# Patient Record
Sex: FEMALE | Race: WHITE | NOT HISPANIC OR LATINO | URBAN - METROPOLITAN AREA
[De-identification: names, ages, dates, MRNs, and addresses within clinical notes are randomized per-mention and may not be internally consistent; named-entity substitution may affect disease eponyms.]

---

## 2022-11-07 ENCOUNTER — EMERGENCY (EMERGENCY)
Facility: HOSPITAL | Age: 42
LOS: 1 days | Discharge: HOME | End: 2022-11-07
Attending: EMERGENCY MEDICINE
Payer: MEDICAID

## 2022-11-07 VITALS
TEMPERATURE: 98 F | OXYGEN SATURATION: 98 % | WEIGHT: 143.3 LBS | RESPIRATION RATE: 18 BRPM | DIASTOLIC BLOOD PRESSURE: 90 MMHG | HEART RATE: 99 BPM | SYSTOLIC BLOOD PRESSURE: 138 MMHG

## 2022-11-07 VITALS
RESPIRATION RATE: 20 BRPM | OXYGEN SATURATION: 99 % | SYSTOLIC BLOOD PRESSURE: 140 MMHG | DIASTOLIC BLOOD PRESSURE: 92 MMHG | HEART RATE: 96 BPM | TEMPERATURE: 98 F

## 2022-11-07 PROCEDURE — 99283 EMERGENCY DEPT VISIT LOW MDM: CPT

## 2022-11-07 PROCEDURE — 99284 EMERGENCY DEPT VISIT MOD MDM: CPT

## 2022-11-07 RX ORDER — FLUCONAZOLE 150 MG/1
1 TABLET ORAL
Qty: 1 | Refills: 0
Start: 2022-11-07

## 2022-11-07 RX ORDER — METRONIDAZOLE 500 MG
1 TABLET ORAL
Qty: 14 | Refills: 0
Start: 2022-11-07 | End: 2022-11-13

## 2022-11-07 NOTE — ED ADULT NURSE NOTE - OBJECTIVE STATEMENT
Patient c/o vaginal itching off and on x1 year with symptoms worsening over last few days. Patient reports odorless vaginal discharge.

## 2022-11-07 NOTE — ED PROVIDER NOTE - CLINICAL SUMMARY MEDICAL DECISION MAKING FREE TEXT BOX
42-year-old woman, history of anxiety and depression, complains of about 1 year of intermittent grayish vaginal discharge symptoms worsened in the past few days--external pelvic exam c/w likely bacterial vaginosis though cannot r/o candidiasis, so will prescribe both metronidazole and Diflucan.  Advised strict return precautions and gyn f/u.

## 2022-11-07 NOTE — ED PROVIDER NOTE - NSFOLLOWUPINSTRUCTIONS_ED_ALL_ED_FT
Bacterial Vaginosis       Bacterial vaginosis is an infection that occurs when the normal balance of bacteria in the vagina changes. This change is caused by an overgrowth of certain bacteria in the vagina. Bacterial vaginosis is the most common vaginal infection among females aged 15 to 44 years.    This condition increases the risk of sexually transmitted infections (STIs). Treatment can help reduce this risk. Treatment is very important for pregnant women because this condition can cause babies to be born early (prematurely) or at a low birth weight.      What are the causes?    This condition is caused by an increase in harmful bacteria that are normally present in small amounts in the vagina. However, the exact reason this condition develops is not known.    You cannot get bacterial vaginosis from toilet seats, bedding, swimming pools, or contact with objects around you.      What increases the risk?    The following factors may make you more likely to develop this condition:  •Having a new sexual partner or multiple sexual partners, or having unprotected sex.      •Douching.      •Having an intrauterine device (IUD).      •Smoking.      •Abusing drugs and alcohol. This may lead to riskier sexual behavior.      •Taking certain antibiotic medicines.      •Being pregnant.        What are the signs or symptoms?    Some women with this condition have no symptoms. Symptoms may include:  •Gray or white vaginal discharge. The discharge can be watery or foamy.      •A fish-like odor with discharge, especially after sex or during menstruation.      •Itching in and around the vagina.      •Burning or pain with urination.        How is this diagnosed?    This condition is diagnosed based on:  •Your medical history.      •A physical exam of the vagina.      •Checking a sample of vaginal fluid for harmful bacteria or abnormal cells.        How is this treated?    This condition is treated with antibiotic medicines. These may be given as a pill, a vaginal cream, or a medicine that is put into the vagina (suppository). If the condition comes back after treatment, a second round of antibiotics may be needed.      Follow these instructions at home:    Medicines     •Take or apply over-the-counter and prescription medicines only as told by your health care provider.      •Take or apply your antibiotic medicine as told by your health care provider. Do not stop using the antibiotic even if you start to feel better.      General instructions     •If you have a female sexual partner, tell her that you have a vaginal infection. She should follow up with her health care provider. If you have a male sexual partner, he does not need treatment.      •Avoid sexual activity until you finish treatment.      •Drink enough fluid to keep your urine pale yellow.    •Keep the area around your vagina and rectum clean.  •Wash the area daily with warm water.      •Wipe yourself from front to back after using the toilet.        •If you are breastfeeding, talk to your health care provider about continuing breastfeeding during treatment.      •Keep all follow-up visits. This is important.        How is this prevented?    Self-care     • Do not douche.      •Wash the outside of your vagina with warm water only.      •Wear cotton or cotton-lined underwear.      •Avoid wearing tight pants and pantyhose, especially during the summer.      Safe sex   •Use protection when having sex. This includes:  •Using condoms.      •Using dental dams. This is a thin layer of a material made of latex or polyurethane that protects the mouth during oral sex.        •Limit the number of sexual partners. To help prevent bacterial vaginosis, it is best to have sex with just one partner (monogamous relationship).      •Make sure you and your sexual partner are tested for STIs.      Drugs and alcohol     • Do not use any products that contain nicotine or tobacco. These products include cigarettes, chewing tobacco, and vaping devices, such as e-cigarettes. If you need help quitting, ask your health care provider.      • Do not use drugs.    • Do not drink alcohol if:  •Your health care provider tells you not to do this.      •You are pregnant, may be pregnant, or are planning to become pregnant.      •If you drink alcohol:  • Limit how much you have to 0–1 drink a day.       •Be aware of how much alcohol is in your drink. In the [place] one drink equals one 12 oz bottle of beer (355 mL), one 5 oz glass of wine (148 mL), or one 1½ oz glass of hard liquor (44 mL).          Where to find more information    •Centers for Disease Control and Prevention: www.cdc.gov      •American Sexual Health Association (DAVIAN): www.ashastd.org      •[place] Department of Health and Human Services, Office on Women's Health: www.womenshealth.gov        Contact a health care provider if:    •Your symptoms do not improve, even after treatment.      •You have more discharge or pain when urinating.      •You have a fever or chills.      •You have pain in your abdomen or pelvis.      •You have pain during sex.      •You have vaginal bleeding between menstrual periods.        Summary    •Bacterial vaginosis is a vaginal infection that occurs when the normal balance of bacteria in the vagina changes. It results from an overgrowth of certain bacteria.      •This condition increases the risk of sexually transmitted infections (STIs). Getting treated can help reduce this risk.      •Treatment is very important for pregnant women because this condition can cause babies to be born early (prematurely) or at low birth weight.      •This condition is treated with antibiotic medicines. These may be given as a pill, a vaginal cream, or a medicine that is put into the vagina (suppository).      This information is not intended to replace advice given to you by your health care provider. Make sure you discuss any questions you have with your health care provider.      Document Revised: 06/17/2021 Document Reviewed: 06/17/2021    [employer] Patient Education © 2022 [employer]

## 2022-11-07 NOTE — ED PROVIDER NOTE - OBJECTIVE STATEMENT
42-year-old woman, history of anxiety and depression, complains of about 1 year of intermittent grayish vaginal discharge symptoms worsened in the past few days she denies any abdominal pain/fever/dysuria/vaginal bleeding.  Menstrual period has been normal.  She denies any recent sexual activity.

## 2022-11-07 NOTE — ED PROVIDER NOTE - PATIENT PORTAL LINK FT
You can access the FollowMyHealth Patient Portal offered by [facility] by registering at the following website: [email or web address]. By joining [employer]’s FollowMyHealth portal, you will also be able to view your health information using other applications (apps) compatible with our system.

## 2022-11-07 NOTE — ED ADULT NURSE NOTE - ED STAT RN HANDOFF DETAILS 2
Patient discharged home as per MD order. All discharge instructions and F/U visits provided , prescription sent to pharmacy. Patient verbalizes understanding leaving ambulatory in no acute distress.

## 2022-11-07 NOTE — ED PROVIDER NOTE - GENITOURINARY, MLM
greyish vaginal discharge visible externally; Pt did not tolerate speculum exam and therefore bimanual exam was deferred;  Chaperone by PCA Carol

## 2023-03-01 NOTE — ED ADULT TRIAGE NOTE - CCCP TRG CHIEF CMPLNT
POC FAST US    Date/Time: 2/28/2023 11:56 PM  Performed by: Irma Pan DO  Authorized by: Irma Pan DO     Patient location:  ED  Procedure details:     Exam Type:  Diagnostic    Indications: blunt abdominal trauma and blunt chest trauma      Assess for:  Hemothorax, pericardial effusion and intra-abdominal fluid    Technique: FAST      Views obtained:  Heart - Pericardial sac, Suprapubic - Pouch of Hank, RUQ - Levy's Pouch and LUQ - Splenorenal space    Image quality: diagnostic      Image availability:  Images available in PACS  FAST Findings:     RUQ (Hepatorenal) free fluid: absent      LUQ (Splenorenal) free fluid: absent      Suprapubic free fluid: absent      Cardiac wall motion: identified      Pericardial effusion: absent    Interpretation:     Impressions: negative vaginal itching

## 2024-11-22 ENCOUNTER — EMERGENCY (EMERGENCY)
Facility: HOSPITAL | Age: 44
LOS: 1 days | Discharge: HOME | End: 2024-11-22
Attending: STUDENT IN AN ORGANIZED HEALTH CARE EDUCATION/TRAINING PROGRAM
Payer: MEDICAID

## 2024-11-22 VITALS
SYSTOLIC BLOOD PRESSURE: 123 MMHG | OXYGEN SATURATION: 97 % | HEART RATE: 98 BPM | RESPIRATION RATE: 17 BRPM | TEMPERATURE: 99 F | DIASTOLIC BLOOD PRESSURE: 77 MMHG

## 2024-11-22 VITALS
HEART RATE: 107 BPM | HEIGHT: 64 IN | RESPIRATION RATE: 18 BRPM | OXYGEN SATURATION: 97 % | TEMPERATURE: 99 F | WEIGHT: 134.92 LBS | DIASTOLIC BLOOD PRESSURE: 87 MMHG | SYSTOLIC BLOOD PRESSURE: 135 MMHG

## 2024-11-22 LAB — HCG UR QL: NEGATIVE — SIGNIFICANT CHANGE UP

## 2024-11-22 PROCEDURE — 99284 EMERGENCY DEPT VISIT MOD MDM: CPT

## 2024-11-22 PROCEDURE — 72110 X-RAY EXAM L-2 SPINE 4/>VWS: CPT

## 2024-11-22 PROCEDURE — 72110 X-RAY EXAM L-2 SPINE 4/>VWS: CPT | Mod: 26

## 2024-11-22 PROCEDURE — 96372 THER/PROPH/DIAG INJ SC/IM: CPT

## 2024-11-22 PROCEDURE — 99283 EMERGENCY DEPT VISIT LOW MDM: CPT | Mod: 25

## 2024-11-22 PROCEDURE — 81025 URINE PREGNANCY TEST: CPT

## 2024-11-22 RX ORDER — KETOROLAC TROMETHAMINE 30 MG/ML
30 INJECTION INTRAMUSCULAR; INTRAVENOUS ONCE
Refills: 0 | Status: DISCONTINUED | OUTPATIENT
Start: 2024-11-22 | End: 2024-11-22

## 2024-11-22 RX ORDER — CYCLOBENZAPRINE HYDROCHLORIDE 30 MG/1
5 CAPSULE, EXTENDED RELEASE ORAL ONCE
Refills: 0 | Status: COMPLETED | OUTPATIENT
Start: 2024-11-22 | End: 2024-11-22

## 2024-11-22 RX ORDER — IBUPROFEN 200 MG
1 TABLET ORAL
Qty: 20 | Refills: 0
Start: 2024-11-22

## 2024-11-22 RX ADMIN — CYCLOBENZAPRINE HYDROCHLORIDE 5 MILLIGRAM(S): 30 CAPSULE, EXTENDED RELEASE ORAL at 05:52

## 2024-11-22 RX ADMIN — KETOROLAC TROMETHAMINE 30 MILLIGRAM(S): 30 INJECTION INTRAMUSCULAR; INTRAVENOUS at 05:52

## 2024-11-22 NOTE — ED PROVIDER NOTE - OBJECTIVE STATEMENT
44-year-old female hx of PUD, anxiety/depression, presenting with lower back pain after a fall earlier today. She was getting off the bus when she fell down onto her buttocks and back. Denies LE numbness, tingling, weakness, saddle anesthesia, fever, chills, IVDU, hx of cancer, or any other concerning features. No other symptoms. Took Advil earlier, then took Tylenol (last dose at 2am).

## 2024-11-22 NOTE — ED ADULT NURSE NOTE - NSFALLRISKINTERV_ED_ALL_ED

## 2024-11-22 NOTE — ED PROVIDER NOTE - CLINICAL SUMMARY MEDICAL DECISION MAKING FREE TEXT BOX
44-year-old female hx of PUD, anxiety/depression, presenting with lower back pain after a fall earlier today with spinal TTP. XR LS spine ordered, urine pregnancy pending. Pain meds provided.

## 2024-11-22 NOTE — ED ADULT TRIAGE NOTE - CHIEF COMPLAINT QUOTE
Pt reports that  she has LOWER BACK PAIN  and BUTTOCKS pain  s/p fall @ 17.30 pm yesterday . Pt  denies  Head strike ,LOC No blood thinner use . Last TYLENOL intake  1000 mg@  2 am today

## 2024-11-22 NOTE — ED PROVIDER NOTE - NSFOLLOWUPINSTRUCTIONS_ED_ALL_ED_FT
n/a Muscle and Joint Pain: What It Means  Muscle and joint pain is aches and pains in your bones, joints, muscles, and tissues that surround them. This pain can happen in any part of the body. It can last for a short time or a long time.    To find the cause of the pain, you may have:  A physical exam.  Lab tests.  Imaging tests.  Follow these instructions at home:  Medicines    Take your medicines only as told.  Treatment may include medicines for pain and inflammation.  You might need to take medicines by mouth or put them on your skin.  Managing pain, stiffness, and swelling    A heating pad for use on the affected area.  Bag of ice on a towel on the skin.  Use ice or an ice pack as told.  Place a towel between your skin and the ice.  Leave the ice on for 20 minutes, 2–3 times a day.  Use heat as told.  Use the heat source that your health care provider recommends, such as a moist heat pack or a heating pad. Do this as often as told.  Place a towel between your skin and the heat source.  Leave the heat on for 20–30 minutes.  If your skin turns red, take off the ice or heat right away to prevent burns. The risk of burns is higher if you can't feel pain, heat, or cold.  Activity    When your pain is very bad, bed rest may be helpful. Lie or sit in any position that's comfortable.  Get up to take short walks at least every 2 hours during the day. This helps you breathe better and keeps your blood flowing. Ask for help if you feel weak or unsteady.  You may continue doing your usual activities unless the activities cause more pain.  When the pain gets better, slowly increase the time and intensity of your activities or exercise.  Lifestyle    Learn to manage anxiety and stress. Stress increases muscle tension and can make pain worse.  Ask your provider if you should try:  Meditation or yoga.  Therapy.  Acupuncture or massage.  General instructions    Your provider may tell you to see a physical therapist to help you do exercises to improve movement and strength in the affected area.  Exercise as told.  Contact a health care provider if:  Your pain gets worse.  Medicines do not help your pain.  You can't use the part of your body that hurts, such as your arm, leg, or neck.  You have trouble sleeping.  You have trouble doing your usual activities.  Get help right away if:  You have a new injury and your pain is worse or different.  You feel numb or you have tingling in the painful area.  This information is not intended to replace advice given to you by your health care provider. Make sure you discuss any questions you have with your health care provider.    Document Revised: 05/17/2024 Document Reviewed: 05/01/2024  Elsevier Patient Education © 2024 [employer]

## 2024-11-22 NOTE — ED PROVIDER NOTE - PATIENT PORTAL LINK FT
You can access the FollowMyHealth Patient Portal offered by [facility] by registering at the following website: [email or web address]. By joining [employer]’s FollowMyHealth portal, you will also be able to view your health information using other applications (apps) compatible with our system.

## 2025-08-22 ENCOUNTER — INPATIENT (INPATIENT)
Facility: HOSPITAL | Age: 45
LOS: 3 days | Discharge: HOME | DRG: 203 | End: 2025-08-26
Attending: STUDENT IN AN ORGANIZED HEALTH CARE EDUCATION/TRAINING PROGRAM | Admitting: STUDENT IN AN ORGANIZED HEALTH CARE EDUCATION/TRAINING PROGRAM
Payer: MEDICAID

## 2025-08-22 VITALS
HEART RATE: 108 BPM | SYSTOLIC BLOOD PRESSURE: 97 MMHG | DIASTOLIC BLOOD PRESSURE: 63 MMHG | WEIGHT: 128.75 LBS | HEIGHT: 63 IN | RESPIRATION RATE: 18 BRPM | OXYGEN SATURATION: 98 %

## 2025-08-22 DIAGNOSIS — J45.901 UNSPECIFIED ASTHMA WITH (ACUTE) EXACERBATION: ICD-10-CM

## 2025-08-22 LAB
ALBUMIN SERPL ELPH-MCNC: 3.6 G/DL — SIGNIFICANT CHANGE UP (ref 3.5–5)
ALP SERPL-CCNC: 79 U/L — SIGNIFICANT CHANGE UP (ref 40–120)
ALT FLD-CCNC: 18 U/L DA — SIGNIFICANT CHANGE UP (ref 10–60)
ANION GAP SERPL CALC-SCNC: 6 MMOL/L — SIGNIFICANT CHANGE UP (ref 5–17)
AST SERPL-CCNC: 12 U/L — SIGNIFICANT CHANGE UP (ref 10–40)
BASE EXCESS BLDA CALC-SCNC: -1.9 MMOL/L — SIGNIFICANT CHANGE UP (ref -2–3)
BASOPHILS # BLD AUTO: 0.06 K/UL — SIGNIFICANT CHANGE UP (ref 0–0.2)
BASOPHILS NFR BLD AUTO: 0.5 % — SIGNIFICANT CHANGE UP (ref 0–2)
BILIRUB SERPL-MCNC: 0.5 MG/DL — SIGNIFICANT CHANGE UP (ref 0.2–1.2)
BLOOD GAS COMMENTS ARTERIAL: SIGNIFICANT CHANGE UP
BUN SERPL-MCNC: 14 MG/DL — SIGNIFICANT CHANGE UP (ref 7–18)
CALCIUM SERPL-MCNC: 9.5 MG/DL — SIGNIFICANT CHANGE UP (ref 8.4–10.5)
CHLORIDE SERPL-SCNC: 102 MMOL/L — SIGNIFICANT CHANGE UP (ref 96–108)
CO2 SERPL-SCNC: 28 MMOL/L — SIGNIFICANT CHANGE UP (ref 22–31)
CREAT SERPL-MCNC: 0.65 MG/DL — SIGNIFICANT CHANGE UP (ref 0.5–1.3)
EGFR: 111 ML/MIN/1.73M2 — SIGNIFICANT CHANGE UP
EGFR: 111 ML/MIN/1.73M2 — SIGNIFICANT CHANGE UP
EOSINOPHIL # BLD AUTO: 0.9 K/UL — HIGH (ref 0–0.5)
EOSINOPHIL NFR BLD AUTO: 7.6 % — HIGH (ref 0–6)
GLUCOSE SERPL-MCNC: 151 MG/DL — HIGH (ref 70–99)
HCG SERPL-ACNC: <1 MIU/ML — SIGNIFICANT CHANGE UP
HCO3 BLDA-SCNC: 24 MMOL/L — SIGNIFICANT CHANGE UP (ref 21–28)
HCT VFR BLD CALC: 41.7 % — SIGNIFICANT CHANGE UP (ref 34.5–45)
HGB BLD-MCNC: 13.3 G/DL — SIGNIFICANT CHANGE UP (ref 11.5–15.5)
HOROWITZ INDEX BLDA+IHG-RTO: 50 — SIGNIFICANT CHANGE UP
IMM GRANULOCYTES NFR BLD AUTO: 0.3 % — SIGNIFICANT CHANGE UP (ref 0–0.9)
LYMPHOCYTES # BLD AUTO: 2.49 K/UL — SIGNIFICANT CHANGE UP (ref 1–3.3)
LYMPHOCYTES # BLD AUTO: 21.2 % — SIGNIFICANT CHANGE UP (ref 13–44)
MCHC RBC-ENTMCNC: 29.4 PG — SIGNIFICANT CHANGE UP (ref 27–34)
MCHC RBC-ENTMCNC: 31.9 G/DL — LOW (ref 32–36)
MCV RBC AUTO: 92.1 FL — SIGNIFICANT CHANGE UP (ref 80–100)
MONOCYTES # BLD AUTO: 0.56 K/UL — SIGNIFICANT CHANGE UP (ref 0–0.9)
MONOCYTES NFR BLD AUTO: 4.8 % — SIGNIFICANT CHANGE UP (ref 2–14)
NEUTROPHILS # BLD AUTO: 7.72 K/UL — HIGH (ref 1.8–7.4)
NEUTROPHILS NFR BLD AUTO: 65.6 % — SIGNIFICANT CHANGE UP (ref 43–77)
NRBC BLD AUTO-RTO: 0 /100 WBCS — SIGNIFICANT CHANGE UP (ref 0–0)
PCO2 BLDA: 46 MMHG — HIGH (ref 32–35)
PH BLDA: 7.33 — LOW (ref 7.35–7.45)
PLATELET # BLD AUTO: 376 K/UL — SIGNIFICANT CHANGE UP (ref 150–400)
PO2 BLDA: 113 MMHG — HIGH (ref 83–108)
POTASSIUM SERPL-MCNC: 4 MMOL/L — SIGNIFICANT CHANGE UP (ref 3.5–5.3)
POTASSIUM SERPL-SCNC: 4 MMOL/L — SIGNIFICANT CHANGE UP (ref 3.5–5.3)
PROT SERPL-MCNC: 7.9 G/DL — SIGNIFICANT CHANGE UP (ref 6–8.3)
RBC # BLD: 4.53 M/UL — SIGNIFICANT CHANGE UP (ref 3.8–5.2)
RBC # FLD: 13.1 % — SIGNIFICANT CHANGE UP (ref 10.3–14.5)
SAO2 % BLDA: 99 % — SIGNIFICANT CHANGE UP
SODIUM SERPL-SCNC: 136 MMOL/L — SIGNIFICANT CHANGE UP (ref 135–145)
TROPONIN I, HIGH SENSITIVITY RESULT: 45.8 NG/L — SIGNIFICANT CHANGE UP
WBC # BLD: 11.77 K/UL — HIGH (ref 3.8–10.5)
WBC # FLD AUTO: 11.77 K/UL — HIGH (ref 3.8–10.5)

## 2025-08-22 PROCEDURE — 84702 CHORIONIC GONADOTROPIN TEST: CPT

## 2025-08-22 PROCEDURE — 82803 BLOOD GASES ANY COMBINATION: CPT

## 2025-08-22 PROCEDURE — 84484 ASSAY OF TROPONIN QUANT: CPT

## 2025-08-22 PROCEDURE — 99285 EMERGENCY DEPT VISIT HI MDM: CPT

## 2025-08-22 PROCEDURE — 71045 X-RAY EXAM CHEST 1 VIEW: CPT

## 2025-08-22 PROCEDURE — 99223 1ST HOSP IP/OBS HIGH 75: CPT | Mod: GC

## 2025-08-22 PROCEDURE — 94640 AIRWAY INHALATION TREATMENT: CPT

## 2025-08-22 PROCEDURE — 71045 X-RAY EXAM CHEST 1 VIEW: CPT | Mod: 26

## 2025-08-22 PROCEDURE — 80053 COMPREHEN METABOLIC PANEL: CPT

## 2025-08-22 PROCEDURE — 36415 COLL VENOUS BLD VENIPUNCTURE: CPT

## 2025-08-22 PROCEDURE — 85025 COMPLETE CBC W/AUTO DIFF WBC: CPT

## 2025-08-22 RX ORDER — METHYLPREDNISOLONE ACETATE 80 MG/ML
40 INJECTION, SUSPENSION INTRA-ARTICULAR; INTRALESIONAL; INTRAMUSCULAR; SOFT TISSUE
Refills: 0 | Status: DISCONTINUED | OUTPATIENT
Start: 2025-08-22 | End: 2025-08-24

## 2025-08-22 RX ORDER — IPRATROPIUM BROMIDE AND ALBUTEROL SULFATE .5; 2.5 MG/3ML; MG/3ML
3 SOLUTION RESPIRATORY (INHALATION) EVERY 6 HOURS
Refills: 0 | Status: DISCONTINUED | OUTPATIENT
Start: 2025-08-22 | End: 2025-08-23

## 2025-08-22 RX ORDER — BENZONATATE 100 MG
100 CAPSULE ORAL EVERY 8 HOURS
Refills: 0 | Status: DISCONTINUED | OUTPATIENT
Start: 2025-08-22 | End: 2025-08-26

## 2025-08-22 RX ORDER — AZITHROMYCIN 250 MG
500 CAPSULE ORAL EVERY 24 HOURS
Refills: 0 | Status: DISCONTINUED | OUTPATIENT
Start: 2025-08-23 | End: 2025-08-23

## 2025-08-22 RX ORDER — CEFTRIAXONE 500 MG/1
1000 INJECTION, POWDER, FOR SOLUTION INTRAMUSCULAR; INTRAVENOUS EVERY 24 HOURS
Refills: 0 | Status: DISCONTINUED | OUTPATIENT
Start: 2025-08-23 | End: 2025-08-23

## 2025-08-22 RX ORDER — IPRATROPIUM BROMIDE AND ALBUTEROL SULFATE .5; 2.5 MG/3ML; MG/3ML
3 SOLUTION RESPIRATORY (INHALATION) ONCE
Refills: 0 | Status: COMPLETED | OUTPATIENT
Start: 2025-08-22 | End: 2025-08-22

## 2025-08-22 RX ORDER — ALPRAZOLAM 0.5 MG
0.5 TABLET, EXTENDED RELEASE 24 HR ORAL ONCE
Refills: 0 | Status: DISCONTINUED | OUTPATIENT
Start: 2025-08-22 | End: 2025-08-22

## 2025-08-22 RX ORDER — CEFTRIAXONE 500 MG/1
1000 INJECTION, POWDER, FOR SOLUTION INTRAMUSCULAR; INTRAVENOUS ONCE
Refills: 0 | Status: COMPLETED | OUTPATIENT
Start: 2025-08-22 | End: 2025-08-22

## 2025-08-22 RX ORDER — ACETAMINOPHEN 500 MG/5ML
650 LIQUID (ML) ORAL EVERY 6 HOURS
Refills: 0 | Status: DISCONTINUED | OUTPATIENT
Start: 2025-08-22 | End: 2025-08-26

## 2025-08-22 RX ORDER — NICOTINE POLACRILEX 4 MG/1
1 GUM, CHEWING ORAL DAILY
Refills: 0 | Status: DISCONTINUED | OUTPATIENT
Start: 2025-08-22 | End: 2025-08-26

## 2025-08-22 RX ORDER — AZITHROMYCIN 250 MG
500 CAPSULE ORAL ONCE
Refills: 0 | Status: COMPLETED | OUTPATIENT
Start: 2025-08-22 | End: 2025-08-22

## 2025-08-22 RX ADMIN — IPRATROPIUM BROMIDE AND ALBUTEROL SULFATE 3 MILLILITER(S): .5; 2.5 SOLUTION RESPIRATORY (INHALATION) at 19:25

## 2025-08-22 RX ADMIN — IPRATROPIUM BROMIDE AND ALBUTEROL SULFATE 3 MILLILITER(S): .5; 2.5 SOLUTION RESPIRATORY (INHALATION) at 22:27

## 2025-08-22 RX ADMIN — METHYLPREDNISOLONE ACETATE 40 MILLIGRAM(S): 80 INJECTION, SUSPENSION INTRA-ARTICULAR; INTRALESIONAL; INTRAMUSCULAR; SOFT TISSUE at 23:10

## 2025-08-22 RX ADMIN — CEFTRIAXONE 100 MILLIGRAM(S): 500 INJECTION, POWDER, FOR SOLUTION INTRAMUSCULAR; INTRAVENOUS at 22:27

## 2025-08-22 RX ADMIN — Medication 0.5 MILLIGRAM(S): at 22:27

## 2025-08-22 RX ADMIN — Medication 250 MILLIGRAM(S): at 23:10

## 2025-08-23 DIAGNOSIS — J96.01 ACUTE RESPIRATORY FAILURE WITH HYPOXIA: ICD-10-CM

## 2025-08-23 DIAGNOSIS — J45.909 UNSPECIFIED ASTHMA, UNCOMPLICATED: ICD-10-CM

## 2025-08-23 DIAGNOSIS — F41.9 ANXIETY DISORDER, UNSPECIFIED: ICD-10-CM

## 2025-08-23 DIAGNOSIS — Z29.9 ENCOUNTER FOR PROPHYLACTIC MEASURES, UNSPECIFIED: ICD-10-CM

## 2025-08-23 LAB
ANION GAP SERPL CALC-SCNC: 5 MMOL/L — SIGNIFICANT CHANGE UP (ref 5–17)
BASOPHILS # BLD AUTO: 0.01 K/UL — SIGNIFICANT CHANGE UP (ref 0–0.2)
BASOPHILS NFR BLD AUTO: 0.1 % — SIGNIFICANT CHANGE UP (ref 0–2)
BUN SERPL-MCNC: 14 MG/DL — SIGNIFICANT CHANGE UP (ref 7–18)
CALCIUM SERPL-MCNC: 8.7 MG/DL — SIGNIFICANT CHANGE UP (ref 8.4–10.5)
CHLORIDE SERPL-SCNC: 105 MMOL/L — SIGNIFICANT CHANGE UP (ref 96–108)
CO2 SERPL-SCNC: 26 MMOL/L — SIGNIFICANT CHANGE UP (ref 22–31)
CREAT SERPL-MCNC: 0.41 MG/DL — LOW (ref 0.5–1.3)
EGFR: 124 ML/MIN/1.73M2 — SIGNIFICANT CHANGE UP
EGFR: 124 ML/MIN/1.73M2 — SIGNIFICANT CHANGE UP
EOSINOPHIL # BLD AUTO: 0 K/UL — SIGNIFICANT CHANGE UP (ref 0–0.5)
EOSINOPHIL NFR BLD AUTO: 0 % — SIGNIFICANT CHANGE UP (ref 0–6)
FLUAV AG NPH QL: SIGNIFICANT CHANGE UP
FLUBV AG NPH QL: SIGNIFICANT CHANGE UP
GLUCOSE SERPL-MCNC: 148 MG/DL — HIGH (ref 70–99)
HCT VFR BLD CALC: 33.7 % — LOW (ref 34.5–45)
HGB BLD-MCNC: 11.2 G/DL — LOW (ref 11.5–15.5)
IMM GRANULOCYTES NFR BLD AUTO: 0.4 % — SIGNIFICANT CHANGE UP (ref 0–0.9)
LACTATE SERPL-SCNC: 1.9 MMOL/L — SIGNIFICANT CHANGE UP (ref 0.7–2)
LYMPHOCYTES # BLD AUTO: 0.56 K/UL — LOW (ref 1–3.3)
LYMPHOCYTES # BLD AUTO: 5.7 % — LOW (ref 13–44)
MAGNESIUM SERPL-MCNC: 2.1 MG/DL — SIGNIFICANT CHANGE UP (ref 1.6–2.6)
MCHC RBC-ENTMCNC: 29.9 PG — SIGNIFICANT CHANGE UP (ref 27–34)
MCHC RBC-ENTMCNC: 33.2 G/DL — SIGNIFICANT CHANGE UP (ref 32–36)
MCV RBC AUTO: 90.1 FL — SIGNIFICANT CHANGE UP (ref 80–100)
MONOCYTES # BLD AUTO: 0.11 K/UL — SIGNIFICANT CHANGE UP (ref 0–0.9)
MONOCYTES NFR BLD AUTO: 1.1 % — LOW (ref 2–14)
NEUTROPHILS # BLD AUTO: 9.1 K/UL — HIGH (ref 1.8–7.4)
NEUTROPHILS NFR BLD AUTO: 92.7 % — HIGH (ref 43–77)
NRBC BLD AUTO-RTO: 0 /100 WBCS — SIGNIFICANT CHANGE UP (ref 0–0)
PHOSPHATE SERPL-MCNC: 2.6 MG/DL — SIGNIFICANT CHANGE UP (ref 2.5–4.5)
PLATELET # BLD AUTO: 315 K/UL — SIGNIFICANT CHANGE UP (ref 150–400)
POTASSIUM SERPL-MCNC: 3.9 MMOL/L — SIGNIFICANT CHANGE UP (ref 3.5–5.3)
POTASSIUM SERPL-SCNC: 3.9 MMOL/L — SIGNIFICANT CHANGE UP (ref 3.5–5.3)
RBC # BLD: 3.74 M/UL — LOW (ref 3.8–5.2)
RBC # FLD: 13 % — SIGNIFICANT CHANGE UP (ref 10.3–14.5)
RSV RNA NPH QL NAA+NON-PROBE: SIGNIFICANT CHANGE UP
SARS-COV-2 RNA SPEC QL NAA+PROBE: SIGNIFICANT CHANGE UP
SODIUM SERPL-SCNC: 136 MMOL/L — SIGNIFICANT CHANGE UP (ref 135–145)
SOURCE RESPIRATORY: SIGNIFICANT CHANGE UP
WBC # BLD: 9.82 K/UL — SIGNIFICANT CHANGE UP (ref 3.8–10.5)
WBC # FLD AUTO: 9.82 K/UL — SIGNIFICANT CHANGE UP (ref 3.8–10.5)

## 2025-08-23 PROCEDURE — 85025 COMPLETE CBC W/AUTO DIFF WBC: CPT

## 2025-08-23 PROCEDURE — 94640 AIRWAY INHALATION TREATMENT: CPT

## 2025-08-23 PROCEDURE — 36415 COLL VENOUS BLD VENIPUNCTURE: CPT

## 2025-08-23 PROCEDURE — 87637 SARSCOV2&INF A&B&RSV AMP PRB: CPT

## 2025-08-23 PROCEDURE — 82803 BLOOD GASES ANY COMBINATION: CPT

## 2025-08-23 PROCEDURE — 99233 SBSQ HOSP IP/OBS HIGH 50: CPT

## 2025-08-23 PROCEDURE — 87040 BLOOD CULTURE FOR BACTERIA: CPT

## 2025-08-23 PROCEDURE — 84702 CHORIONIC GONADOTROPIN TEST: CPT

## 2025-08-23 PROCEDURE — 71045 X-RAY EXAM CHEST 1 VIEW: CPT

## 2025-08-23 PROCEDURE — 83735 ASSAY OF MAGNESIUM: CPT

## 2025-08-23 PROCEDURE — 84100 ASSAY OF PHOSPHORUS: CPT

## 2025-08-23 PROCEDURE — 80053 COMPREHEN METABOLIC PANEL: CPT

## 2025-08-23 PROCEDURE — 80048 BASIC METABOLIC PNL TOTAL CA: CPT

## 2025-08-23 PROCEDURE — 86738 MYCOPLASMA ANTIBODY: CPT

## 2025-08-23 PROCEDURE — 84484 ASSAY OF TROPONIN QUANT: CPT

## 2025-08-23 PROCEDURE — 83605 ASSAY OF LACTIC ACID: CPT

## 2025-08-23 RX ORDER — DEXTROMETHORPHAN HBR, GUAIFENESIN 200 MG/10ML
600 LIQUID ORAL EVERY 12 HOURS
Refills: 0 | Status: DISCONTINUED | OUTPATIENT
Start: 2025-08-23 | End: 2025-08-23

## 2025-08-23 RX ORDER — MELATONIN 5 MG
3 TABLET ORAL ONCE
Refills: 0 | Status: COMPLETED | OUTPATIENT
Start: 2025-08-23 | End: 2025-08-23

## 2025-08-23 RX ORDER — BUPROPION HYDROBROMIDE 522 MG/1
1 TABLET, EXTENDED RELEASE ORAL
Refills: 0 | DISCHARGE

## 2025-08-23 RX ORDER — KETOROLAC TROMETHAMINE 30 MG/ML
15 INJECTION, SOLUTION INTRAMUSCULAR; INTRAVENOUS ONCE
Refills: 0 | Status: DISCONTINUED | OUTPATIENT
Start: 2025-08-23 | End: 2025-08-23

## 2025-08-23 RX ORDER — IPRATROPIUM BROMIDE AND ALBUTEROL SULFATE .5; 2.5 MG/3ML; MG/3ML
3 SOLUTION RESPIRATORY (INHALATION) EVERY 6 HOURS
Refills: 0 | Status: DISCONTINUED | OUTPATIENT
Start: 2025-08-23 | End: 2025-08-26

## 2025-08-23 RX ORDER — TOPIRAMATE 25 MG/1
50 TABLET, FILM COATED ORAL
Refills: 0 | Status: DISCONTINUED | OUTPATIENT
Start: 2025-08-23 | End: 2025-08-26

## 2025-08-23 RX ORDER — SODIUM CHLORIDE 9 G/1000ML
1800 INJECTION, SOLUTION INTRAVENOUS ONCE
Refills: 0 | Status: COMPLETED | OUTPATIENT
Start: 2025-08-23 | End: 2025-08-23

## 2025-08-23 RX ORDER — ENOXAPARIN SODIUM 100 MG/ML
40 INJECTION SUBCUTANEOUS EVERY 24 HOURS
Refills: 0 | Status: DISCONTINUED | OUTPATIENT
Start: 2025-08-23 | End: 2025-08-26

## 2025-08-23 RX ORDER — ESCITALOPRAM OXALATE 20 MG/1
20 TABLET ORAL
Refills: 0 | Status: DISCONTINUED | OUTPATIENT
Start: 2025-08-23 | End: 2025-08-26

## 2025-08-23 RX ORDER — DEXTROMETHORPHAN HBR, GUAIFENESIN 200 MG/10ML
600 LIQUID ORAL EVERY 12 HOURS
Refills: 0 | Status: COMPLETED | OUTPATIENT
Start: 2025-08-23 | End: 2025-08-26

## 2025-08-23 RX ORDER — ALPRAZOLAM 0.5 MG
0.5 TABLET, EXTENDED RELEASE 24 HR ORAL
Refills: 0 | Status: DISCONTINUED | OUTPATIENT
Start: 2025-08-23 | End: 2025-08-25

## 2025-08-23 RX ORDER — ALPRAZOLAM 0.5 MG
0.5 TABLET, EXTENDED RELEASE 24 HR ORAL DAILY
Refills: 0 | Status: DISCONTINUED | OUTPATIENT
Start: 2025-08-23 | End: 2025-08-23

## 2025-08-23 RX ORDER — BUPROPION HYDROBROMIDE 522 MG/1
450 TABLET, EXTENDED RELEASE ORAL DAILY
Refills: 0 | Status: DISCONTINUED | OUTPATIENT
Start: 2025-08-23 | End: 2025-08-26

## 2025-08-23 RX ORDER — ESCITALOPRAM OXALATE 20 MG/1
20 TABLET ORAL DAILY
Refills: 0 | Status: DISCONTINUED | OUTPATIENT
Start: 2025-08-23 | End: 2025-08-23

## 2025-08-23 RX ADMIN — Medication 0.5 MILLIGRAM(S): at 22:55

## 2025-08-23 RX ADMIN — KETOROLAC TROMETHAMINE 15 MILLIGRAM(S): 30 INJECTION, SOLUTION INTRAMUSCULAR; INTRAVENOUS at 03:03

## 2025-08-23 RX ADMIN — METHYLPREDNISOLONE ACETATE 40 MILLIGRAM(S): 80 INJECTION, SUSPENSION INTRA-ARTICULAR; INTRALESIONAL; INTRAMUSCULAR; SOFT TISSUE at 17:38

## 2025-08-23 RX ADMIN — METHYLPREDNISOLONE ACETATE 40 MILLIGRAM(S): 80 INJECTION, SUSPENSION INTRA-ARTICULAR; INTRALESIONAL; INTRAMUSCULAR; SOFT TISSUE at 05:01

## 2025-08-23 RX ADMIN — TOPIRAMATE 50 MILLIGRAM(S): 25 TABLET, FILM COATED ORAL at 11:59

## 2025-08-23 RX ADMIN — TOPIRAMATE 50 MILLIGRAM(S): 25 TABLET, FILM COATED ORAL at 17:39

## 2025-08-23 RX ADMIN — ENOXAPARIN SODIUM 40 MILLIGRAM(S): 100 INJECTION SUBCUTANEOUS at 22:54

## 2025-08-23 RX ADMIN — DEXTROMETHORPHAN HBR, GUAIFENESIN 600 MILLIGRAM(S): 200 LIQUID ORAL at 17:39

## 2025-08-23 RX ADMIN — Medication 3 MILLIGRAM(S): at 03:03

## 2025-08-23 RX ADMIN — Medication 0.5 MILLIGRAM(S): at 11:30

## 2025-08-23 RX ADMIN — KETOROLAC TROMETHAMINE 15 MILLIGRAM(S): 30 INJECTION, SOLUTION INTRAMUSCULAR; INTRAVENOUS at 03:30

## 2025-08-23 RX ADMIN — ESCITALOPRAM OXALATE 20 MILLIGRAM(S): 20 TABLET ORAL at 17:39

## 2025-08-23 RX ADMIN — Medication 650 MILLIGRAM(S): at 00:44

## 2025-08-23 RX ADMIN — Medication 650 MILLIGRAM(S): at 01:22

## 2025-08-23 RX ADMIN — NICOTINE POLACRILEX 1 PATCH: 4 GUM, CHEWING ORAL at 19:44

## 2025-08-23 RX ADMIN — NICOTINE POLACRILEX 1 PATCH: 4 GUM, CHEWING ORAL at 11:32

## 2025-08-23 RX ADMIN — DEXTROMETHORPHAN HBR, GUAIFENESIN 600 MILLIGRAM(S): 200 LIQUID ORAL at 12:00

## 2025-08-23 RX ADMIN — SODIUM CHLORIDE 1800 MILLILITER(S): 9 INJECTION, SOLUTION INTRAVENOUS at 00:44

## 2025-08-23 RX ADMIN — BUPROPION HYDROBROMIDE 450 MILLIGRAM(S): 522 TABLET, EXTENDED RELEASE ORAL at 11:30

## 2025-08-23 RX ADMIN — IPRATROPIUM BROMIDE AND ALBUTEROL SULFATE 3 MILLILITER(S): .5; 2.5 SOLUTION RESPIRATORY (INHALATION) at 20:21

## 2025-08-23 RX ADMIN — IPRATROPIUM BROMIDE AND ALBUTEROL SULFATE 3 MILLILITER(S): .5; 2.5 SOLUTION RESPIRATORY (INHALATION) at 09:42

## 2025-08-24 LAB
ANION GAP SERPL CALC-SCNC: 5 MMOL/L — SIGNIFICANT CHANGE UP (ref 5–17)
BASOPHILS # BLD AUTO: 0.02 K/UL — SIGNIFICANT CHANGE UP (ref 0–0.2)
BASOPHILS NFR BLD AUTO: 0.1 % — SIGNIFICANT CHANGE UP (ref 0–2)
BUN SERPL-MCNC: 17 MG/DL — SIGNIFICANT CHANGE UP (ref 7–18)
CALCIUM SERPL-MCNC: 8.5 MG/DL — SIGNIFICANT CHANGE UP (ref 8.4–10.5)
CHLORIDE SERPL-SCNC: 109 MMOL/L — HIGH (ref 96–108)
CO2 SERPL-SCNC: 24 MMOL/L — SIGNIFICANT CHANGE UP (ref 22–31)
CREAT SERPL-MCNC: 0.59 MG/DL — SIGNIFICANT CHANGE UP (ref 0.5–1.3)
EGFR: 114 ML/MIN/1.73M2 — SIGNIFICANT CHANGE UP
EGFR: 114 ML/MIN/1.73M2 — SIGNIFICANT CHANGE UP
EOSINOPHIL # BLD AUTO: 0 K/UL — SIGNIFICANT CHANGE UP (ref 0–0.5)
EOSINOPHIL NFR BLD AUTO: 0 % — SIGNIFICANT CHANGE UP (ref 0–6)
GLUCOSE SERPL-MCNC: 111 MG/DL — HIGH (ref 70–99)
HCT VFR BLD CALC: 34.6 % — SIGNIFICANT CHANGE UP (ref 34.5–45)
HGB BLD-MCNC: 11.3 G/DL — LOW (ref 11.5–15.5)
IMM GRANULOCYTES NFR BLD AUTO: 0.6 % — SIGNIFICANT CHANGE UP (ref 0–0.9)
LYMPHOCYTES # BLD AUTO: 1.55 K/UL — SIGNIFICANT CHANGE UP (ref 1–3.3)
LYMPHOCYTES # BLD AUTO: 8.4 % — LOW (ref 13–44)
MAGNESIUM SERPL-MCNC: 2 MG/DL — SIGNIFICANT CHANGE UP (ref 1.6–2.6)
MCHC RBC-ENTMCNC: 30.3 PG — SIGNIFICANT CHANGE UP (ref 27–34)
MCHC RBC-ENTMCNC: 32.7 G/DL — SIGNIFICANT CHANGE UP (ref 32–36)
MCV RBC AUTO: 92.8 FL — SIGNIFICANT CHANGE UP (ref 80–100)
MONOCYTES # BLD AUTO: 0.88 K/UL — SIGNIFICANT CHANGE UP (ref 0–0.9)
MONOCYTES NFR BLD AUTO: 4.8 % — SIGNIFICANT CHANGE UP (ref 2–14)
NEUTROPHILS # BLD AUTO: 15.9 K/UL — HIGH (ref 1.8–7.4)
NEUTROPHILS NFR BLD AUTO: 86.1 % — HIGH (ref 43–77)
NRBC BLD AUTO-RTO: 0 /100 WBCS — SIGNIFICANT CHANGE UP (ref 0–0)
PHOSPHATE SERPL-MCNC: 3.5 MG/DL — SIGNIFICANT CHANGE UP (ref 2.5–4.5)
PLATELET # BLD AUTO: 323 K/UL — SIGNIFICANT CHANGE UP (ref 150–400)
POTASSIUM SERPL-MCNC: 4.8 MMOL/L — SIGNIFICANT CHANGE UP (ref 3.5–5.3)
POTASSIUM SERPL-SCNC: 4.8 MMOL/L — SIGNIFICANT CHANGE UP (ref 3.5–5.3)
RBC # BLD: 3.73 M/UL — LOW (ref 3.8–5.2)
RBC # FLD: 13.2 % — SIGNIFICANT CHANGE UP (ref 10.3–14.5)
SODIUM SERPL-SCNC: 138 MMOL/L — SIGNIFICANT CHANGE UP (ref 135–145)
WBC # BLD: 18.47 K/UL — HIGH (ref 3.8–10.5)
WBC # FLD AUTO: 18.47 K/UL — HIGH (ref 3.8–10.5)

## 2025-08-24 PROCEDURE — 99232 SBSQ HOSP IP/OBS MODERATE 35: CPT | Mod: GC

## 2025-08-24 RX ORDER — PREDNISONE 20 MG/1
40 TABLET ORAL DAILY
Refills: 0 | Status: DISCONTINUED | OUTPATIENT
Start: 2025-08-25 | End: 2025-08-26

## 2025-08-24 RX ADMIN — IPRATROPIUM BROMIDE AND ALBUTEROL SULFATE 3 MILLILITER(S): .5; 2.5 SOLUTION RESPIRATORY (INHALATION) at 20:04

## 2025-08-24 RX ADMIN — IPRATROPIUM BROMIDE AND ALBUTEROL SULFATE 3 MILLILITER(S): .5; 2.5 SOLUTION RESPIRATORY (INHALATION) at 15:56

## 2025-08-24 RX ADMIN — NICOTINE POLACRILEX 1 PATCH: 4 GUM, CHEWING ORAL at 11:28

## 2025-08-24 RX ADMIN — IPRATROPIUM BROMIDE AND ALBUTEROL SULFATE 3 MILLILITER(S): .5; 2.5 SOLUTION RESPIRATORY (INHALATION) at 10:07

## 2025-08-24 RX ADMIN — DEXTROMETHORPHAN HBR, GUAIFENESIN 600 MILLIGRAM(S): 200 LIQUID ORAL at 05:28

## 2025-08-24 RX ADMIN — ENOXAPARIN SODIUM 40 MILLIGRAM(S): 100 INJECTION SUBCUTANEOUS at 21:10

## 2025-08-24 RX ADMIN — TOPIRAMATE 50 MILLIGRAM(S): 25 TABLET, FILM COATED ORAL at 05:29

## 2025-08-24 RX ADMIN — NICOTINE POLACRILEX 1 PATCH: 4 GUM, CHEWING ORAL at 11:30

## 2025-08-24 RX ADMIN — Medication 0.5 MILLIGRAM(S): at 21:09

## 2025-08-24 RX ADMIN — TOPIRAMATE 50 MILLIGRAM(S): 25 TABLET, FILM COATED ORAL at 17:53

## 2025-08-24 RX ADMIN — METHYLPREDNISOLONE ACETATE 40 MILLIGRAM(S): 80 INJECTION, SUSPENSION INTRA-ARTICULAR; INTRALESIONAL; INTRAMUSCULAR; SOFT TISSUE at 05:28

## 2025-08-24 RX ADMIN — NICOTINE POLACRILEX 1 PATCH: 4 GUM, CHEWING ORAL at 07:28

## 2025-08-24 RX ADMIN — NICOTINE POLACRILEX 1 PATCH: 4 GUM, CHEWING ORAL at 20:59

## 2025-08-24 RX ADMIN — Medication 0.5 MILLIGRAM(S): at 12:26

## 2025-08-24 RX ADMIN — ESCITALOPRAM OXALATE 20 MILLIGRAM(S): 20 TABLET ORAL at 17:52

## 2025-08-24 RX ADMIN — DEXTROMETHORPHAN HBR, GUAIFENESIN 600 MILLIGRAM(S): 200 LIQUID ORAL at 17:52

## 2025-08-24 RX ADMIN — IPRATROPIUM BROMIDE AND ALBUTEROL SULFATE 3 MILLILITER(S): .5; 2.5 SOLUTION RESPIRATORY (INHALATION) at 02:20

## 2025-08-24 RX ADMIN — BUPROPION HYDROBROMIDE 450 MILLIGRAM(S): 522 TABLET, EXTENDED RELEASE ORAL at 11:30

## 2025-08-25 DIAGNOSIS — N89.8 OTHER SPECIFIED NONINFLAMMATORY DISORDERS OF VAGINA: ICD-10-CM

## 2025-08-25 LAB
ANION GAP SERPL CALC-SCNC: 5 MMOL/L — SIGNIFICANT CHANGE UP (ref 5–17)
BASOPHILS # BLD AUTO: 0.05 K/UL — SIGNIFICANT CHANGE UP (ref 0–0.2)
BASOPHILS NFR BLD AUTO: 0.4 % — SIGNIFICANT CHANGE UP (ref 0–2)
BUN SERPL-MCNC: 21 MG/DL — HIGH (ref 7–18)
CALCIUM SERPL-MCNC: 8.8 MG/DL — SIGNIFICANT CHANGE UP (ref 8.4–10.5)
CHLORIDE SERPL-SCNC: 108 MMOL/L — SIGNIFICANT CHANGE UP (ref 96–108)
CO2 SERPL-SCNC: 24 MMOL/L — SIGNIFICANT CHANGE UP (ref 22–31)
CREAT SERPL-MCNC: 0.58 MG/DL — SIGNIFICANT CHANGE UP (ref 0.5–1.3)
EGFR: 114 ML/MIN/1.73M2 — SIGNIFICANT CHANGE UP
EGFR: 114 ML/MIN/1.73M2 — SIGNIFICANT CHANGE UP
EOSINOPHIL # BLD AUTO: 0.11 K/UL — SIGNIFICANT CHANGE UP (ref 0–0.5)
EOSINOPHIL NFR BLD AUTO: 1 % — SIGNIFICANT CHANGE UP (ref 0–6)
GLUCOSE SERPL-MCNC: 101 MG/DL — HIGH (ref 70–99)
HCT VFR BLD CALC: 35 % — SIGNIFICANT CHANGE UP (ref 34.5–45)
HGB BLD-MCNC: 11.4 G/DL — LOW (ref 11.5–15.5)
IMM GRANULOCYTES NFR BLD AUTO: 0.5 % — SIGNIFICANT CHANGE UP (ref 0–0.9)
LYMPHOCYTES # BLD AUTO: 2.52 K/UL — SIGNIFICANT CHANGE UP (ref 1–3.3)
LYMPHOCYTES # BLD AUTO: 22.4 % — SIGNIFICANT CHANGE UP (ref 13–44)
MAGNESIUM SERPL-MCNC: 1.8 MG/DL — SIGNIFICANT CHANGE UP (ref 1.6–2.6)
MCHC RBC-ENTMCNC: 29.5 PG — SIGNIFICANT CHANGE UP (ref 27–34)
MCHC RBC-ENTMCNC: 32.6 G/DL — SIGNIFICANT CHANGE UP (ref 32–36)
MCV RBC AUTO: 90.7 FL — SIGNIFICANT CHANGE UP (ref 80–100)
MONOCYTES # BLD AUTO: 0.7 K/UL — SIGNIFICANT CHANGE UP (ref 0–0.9)
MONOCYTES NFR BLD AUTO: 6.2 % — SIGNIFICANT CHANGE UP (ref 2–14)
NEUTROPHILS # BLD AUTO: 7.83 K/UL — HIGH (ref 1.8–7.4)
NEUTROPHILS NFR BLD AUTO: 69.5 % — SIGNIFICANT CHANGE UP (ref 43–77)
NRBC BLD AUTO-RTO: 0 /100 WBCS — SIGNIFICANT CHANGE UP (ref 0–0)
PHOSPHATE SERPL-MCNC: 3 MG/DL — SIGNIFICANT CHANGE UP (ref 2.5–4.5)
PLATELET # BLD AUTO: 338 K/UL — SIGNIFICANT CHANGE UP (ref 150–400)
POTASSIUM SERPL-MCNC: 3.6 MMOL/L — SIGNIFICANT CHANGE UP (ref 3.5–5.3)
POTASSIUM SERPL-SCNC: 3.6 MMOL/L — SIGNIFICANT CHANGE UP (ref 3.5–5.3)
RBC # BLD: 3.86 M/UL — SIGNIFICANT CHANGE UP (ref 3.8–5.2)
RBC # FLD: 13.6 % — SIGNIFICANT CHANGE UP (ref 10.3–14.5)
SODIUM SERPL-SCNC: 137 MMOL/L — SIGNIFICANT CHANGE UP (ref 135–145)
WBC # BLD: 11.27 K/UL — HIGH (ref 3.8–10.5)
WBC # FLD AUTO: 11.27 K/UL — HIGH (ref 3.8–10.5)

## 2025-08-25 PROCEDURE — 99222 1ST HOSP IP/OBS MODERATE 55: CPT | Mod: GC

## 2025-08-25 PROCEDURE — 99233 SBSQ HOSP IP/OBS HIGH 50: CPT | Mod: GC

## 2025-08-25 PROCEDURE — 99254 IP/OBS CNSLTJ NEW/EST MOD 60: CPT | Mod: GC

## 2025-08-25 RX ORDER — ALPRAZOLAM 0.5 MG
0.5 TABLET, EXTENDED RELEASE 24 HR ORAL DAILY
Refills: 0 | Status: DISCONTINUED | OUTPATIENT
Start: 2025-08-25 | End: 2025-08-26

## 2025-08-25 RX ORDER — ALPRAZOLAM 0.5 MG
0.5 TABLET, EXTENDED RELEASE 24 HR ORAL ONCE
Refills: 0 | Status: DISCONTINUED | OUTPATIENT
Start: 2025-08-25 | End: 2025-08-25

## 2025-08-25 RX ORDER — MELATONIN 5 MG
3 TABLET ORAL ONCE
Refills: 0 | Status: COMPLETED | OUTPATIENT
Start: 2025-08-25 | End: 2025-08-25

## 2025-08-25 RX ORDER — MELATONIN 5 MG
3 TABLET ORAL AT BEDTIME
Refills: 0 | Status: DISCONTINUED | OUTPATIENT
Start: 2025-08-25 | End: 2025-08-25

## 2025-08-25 RX ADMIN — BUPROPION HYDROBROMIDE 450 MILLIGRAM(S): 522 TABLET, EXTENDED RELEASE ORAL at 13:01

## 2025-08-25 RX ADMIN — TOPIRAMATE 50 MILLIGRAM(S): 25 TABLET, FILM COATED ORAL at 17:57

## 2025-08-25 RX ADMIN — ESCITALOPRAM OXALATE 20 MILLIGRAM(S): 20 TABLET ORAL at 17:57

## 2025-08-25 RX ADMIN — DEXTROMETHORPHAN HBR, GUAIFENESIN 600 MILLIGRAM(S): 200 LIQUID ORAL at 05:21

## 2025-08-25 RX ADMIN — TOPIRAMATE 50 MILLIGRAM(S): 25 TABLET, FILM COATED ORAL at 05:21

## 2025-08-25 RX ADMIN — IPRATROPIUM BROMIDE AND ALBUTEROL SULFATE 3 MILLILITER(S): .5; 2.5 SOLUTION RESPIRATORY (INHALATION) at 02:12

## 2025-08-25 RX ADMIN — IPRATROPIUM BROMIDE AND ALBUTEROL SULFATE 3 MILLILITER(S): .5; 2.5 SOLUTION RESPIRATORY (INHALATION) at 16:05

## 2025-08-25 RX ADMIN — IPRATROPIUM BROMIDE AND ALBUTEROL SULFATE 3 MILLILITER(S): .5; 2.5 SOLUTION RESPIRATORY (INHALATION) at 08:39

## 2025-08-25 RX ADMIN — PREDNISONE 40 MILLIGRAM(S): 20 TABLET ORAL at 05:21

## 2025-08-25 RX ADMIN — NICOTINE POLACRILEX 1 PATCH: 4 GUM, CHEWING ORAL at 13:03

## 2025-08-25 RX ADMIN — IPRATROPIUM BROMIDE AND ALBUTEROL SULFATE 3 MILLILITER(S): .5; 2.5 SOLUTION RESPIRATORY (INHALATION) at 21:04

## 2025-08-25 RX ADMIN — ENOXAPARIN SODIUM 40 MILLIGRAM(S): 100 INJECTION SUBCUTANEOUS at 21:58

## 2025-08-25 RX ADMIN — Medication 0.5 MILLIGRAM(S): at 22:54

## 2025-08-25 RX ADMIN — NICOTINE POLACRILEX 1 PATCH: 4 GUM, CHEWING ORAL at 19:46

## 2025-08-25 RX ADMIN — Medication 1 DOSE(S): at 21:50

## 2025-08-25 RX ADMIN — NICOTINE POLACRILEX 1 PATCH: 4 GUM, CHEWING ORAL at 11:04

## 2025-08-25 RX ADMIN — Medication 0.5 MILLIGRAM(S): at 09:49

## 2025-08-25 RX ADMIN — DEXTROMETHORPHAN HBR, GUAIFENESIN 600 MILLIGRAM(S): 200 LIQUID ORAL at 17:56

## 2025-08-25 RX ADMIN — NICOTINE POLACRILEX 1 PATCH: 4 GUM, CHEWING ORAL at 07:44

## 2025-08-26 VITALS
DIASTOLIC BLOOD PRESSURE: 72 MMHG | RESPIRATION RATE: 18 BRPM | TEMPERATURE: 98 F | OXYGEN SATURATION: 96 % | HEART RATE: 85 BPM | SYSTOLIC BLOOD PRESSURE: 108 MMHG

## 2025-08-26 LAB
ANION GAP SERPL CALC-SCNC: 6 MMOL/L — SIGNIFICANT CHANGE UP (ref 5–17)
BASOPHILS # BLD AUTO: 0.06 K/UL — SIGNIFICANT CHANGE UP (ref 0–0.2)
BASOPHILS NFR BLD AUTO: 0.5 % — SIGNIFICANT CHANGE UP (ref 0–2)
BUN SERPL-MCNC: 24 MG/DL — HIGH (ref 7–18)
CALCIUM SERPL-MCNC: 8.5 MG/DL — SIGNIFICANT CHANGE UP (ref 8.4–10.5)
CHLORIDE SERPL-SCNC: 108 MMOL/L — SIGNIFICANT CHANGE UP (ref 96–108)
CO2 SERPL-SCNC: 24 MMOL/L — SIGNIFICANT CHANGE UP (ref 22–31)
CREAT SERPL-MCNC: 0.72 MG/DL — SIGNIFICANT CHANGE UP (ref 0.5–1.3)
EGFR: 106 ML/MIN/1.73M2 — SIGNIFICANT CHANGE UP
EGFR: 106 ML/MIN/1.73M2 — SIGNIFICANT CHANGE UP
EOSINOPHIL # BLD AUTO: 0.16 K/UL — SIGNIFICANT CHANGE UP (ref 0–0.5)
EOSINOPHIL NFR BLD AUTO: 1.2 % — SIGNIFICANT CHANGE UP (ref 0–6)
GLUCOSE SERPL-MCNC: 82 MG/DL — SIGNIFICANT CHANGE UP (ref 70–99)
HCT VFR BLD CALC: 37.1 % — SIGNIFICANT CHANGE UP (ref 34.5–45)
HGB BLD-MCNC: 12.4 G/DL — SIGNIFICANT CHANGE UP (ref 11.5–15.5)
IMM GRANULOCYTES NFR BLD AUTO: 0.8 % — SIGNIFICANT CHANGE UP (ref 0–0.9)
LYMPHOCYTES # BLD AUTO: 29.4 % — SIGNIFICANT CHANGE UP (ref 13–44)
LYMPHOCYTES # BLD AUTO: 3.9 K/UL — HIGH (ref 1–3.3)
M PNEUMO IGM SER-ACNC: 0.47 INDEX — SIGNIFICANT CHANGE UP (ref 0–0.9)
MCHC RBC-ENTMCNC: 30 PG — SIGNIFICANT CHANGE UP (ref 27–34)
MCHC RBC-ENTMCNC: 33.4 G/DL — SIGNIFICANT CHANGE UP (ref 32–36)
MCV RBC AUTO: 89.6 FL — SIGNIFICANT CHANGE UP (ref 80–100)
MONOCYTES # BLD AUTO: 1.08 K/UL — HIGH (ref 0–0.9)
MONOCYTES NFR BLD AUTO: 8.1 % — SIGNIFICANT CHANGE UP (ref 2–14)
MYCOPLASMA AG SPEC QL: NEGATIVE — SIGNIFICANT CHANGE UP
NEUTROPHILS # BLD AUTO: 7.98 K/UL — HIGH (ref 1.8–7.4)
NEUTROPHILS NFR BLD AUTO: 60 % — SIGNIFICANT CHANGE UP (ref 43–77)
NRBC BLD AUTO-RTO: 0 /100 WBCS — SIGNIFICANT CHANGE UP (ref 0–0)
PLATELET # BLD AUTO: 403 K/UL — HIGH (ref 150–400)
POTASSIUM SERPL-MCNC: 3.2 MMOL/L — LOW (ref 3.5–5.3)
POTASSIUM SERPL-SCNC: 3.2 MMOL/L — LOW (ref 3.5–5.3)
RBC # BLD: 4.14 M/UL — SIGNIFICANT CHANGE UP (ref 3.8–5.2)
RBC # FLD: 13.1 % — SIGNIFICANT CHANGE UP (ref 10.3–14.5)
SODIUM SERPL-SCNC: 138 MMOL/L — SIGNIFICANT CHANGE UP (ref 135–145)
WBC # BLD: 13.28 K/UL — HIGH (ref 3.8–10.5)
WBC # FLD AUTO: 13.28 K/UL — HIGH (ref 3.8–10.5)

## 2025-08-26 PROCEDURE — 87040 BLOOD CULTURE FOR BACTERIA: CPT

## 2025-08-26 PROCEDURE — 99285 EMERGENCY DEPT VISIT HI MDM: CPT | Mod: 25

## 2025-08-26 PROCEDURE — 94640 AIRWAY INHALATION TREATMENT: CPT

## 2025-08-26 PROCEDURE — 99239 HOSP IP/OBS DSCHRG MGMT >30: CPT | Mod: GC

## 2025-08-26 PROCEDURE — 83735 ASSAY OF MAGNESIUM: CPT

## 2025-08-26 PROCEDURE — 93005 ELECTROCARDIOGRAM TRACING: CPT

## 2025-08-26 PROCEDURE — 71045 X-RAY EXAM CHEST 1 VIEW: CPT

## 2025-08-26 PROCEDURE — 84702 CHORIONIC GONADOTROPIN TEST: CPT

## 2025-08-26 PROCEDURE — 80048 BASIC METABOLIC PNL TOTAL CA: CPT

## 2025-08-26 PROCEDURE — 80053 COMPREHEN METABOLIC PANEL: CPT

## 2025-08-26 PROCEDURE — 36415 COLL VENOUS BLD VENIPUNCTURE: CPT

## 2025-08-26 PROCEDURE — 84484 ASSAY OF TROPONIN QUANT: CPT

## 2025-08-26 PROCEDURE — 83605 ASSAY OF LACTIC ACID: CPT

## 2025-08-26 PROCEDURE — 82803 BLOOD GASES ANY COMBINATION: CPT

## 2025-08-26 PROCEDURE — 87637 SARSCOV2&INF A&B&RSV AMP PRB: CPT

## 2025-08-26 PROCEDURE — 84100 ASSAY OF PHOSPHORUS: CPT

## 2025-08-26 PROCEDURE — 85025 COMPLETE CBC W/AUTO DIFF WBC: CPT

## 2025-08-26 PROCEDURE — 86738 MYCOPLASMA ANTIBODY: CPT

## 2025-08-26 RX ORDER — BUDESONIDE AND FORMOTEROL FUMARATE DIHYDRATE 80; 4.5 UG/1; UG/1
2 AEROSOL RESPIRATORY (INHALATION)
Qty: 2 | Refills: 0
Start: 2025-08-26

## 2025-08-26 RX ORDER — BUPROPION HYDROBROMIDE 522 MG/1
1 TABLET, EXTENDED RELEASE ORAL
Qty: 0 | Refills: 0 | DISCHARGE
Start: 2025-08-26

## 2025-08-26 RX ORDER — PREDNISONE 20 MG/1
2 TABLET ORAL
Qty: 10 | Refills: 0
Start: 2025-08-26 | End: 2025-08-30

## 2025-08-26 RX ORDER — ESCITALOPRAM OXALATE 20 MG/1
1 TABLET ORAL
Refills: 0 | DISCHARGE

## 2025-08-26 RX ORDER — BUDESONIDE AND FORMOTEROL FUMARATE DIHYDRATE 80; 4.5 UG/1; UG/1
2 AEROSOL RESPIRATORY (INHALATION)
Qty: 2 | Refills: 0
Start: 2025-08-26 | End: 2025-09-24

## 2025-08-26 RX ORDER — PREDNISONE 20 MG/1
2 TABLET ORAL
Qty: 0 | Refills: 0 | DISCHARGE
Start: 2025-08-26

## 2025-08-26 RX ORDER — ALBUTEROL SULFATE 2.5 MG/3ML
2 VIAL, NEBULIZER (ML) INHALATION
Qty: 1 | Refills: 0
Start: 2025-08-26 | End: 2025-09-24

## 2025-08-26 RX ORDER — ALPRAZOLAM 0.5 MG
1 TABLET, EXTENDED RELEASE 24 HR ORAL
Qty: 0 | Refills: 0 | DISCHARGE
Start: 2025-08-26

## 2025-08-26 RX ORDER — ESCITALOPRAM OXALATE 20 MG/1
1 TABLET ORAL
Qty: 0 | Refills: 0 | DISCHARGE
Start: 2025-08-26

## 2025-08-26 RX ORDER — BUDESONIDE AND FORMOTEROL FUMARATE DIHYDRATE 80; 4.5 UG/1; UG/1
2 AEROSOL RESPIRATORY (INHALATION)
Qty: 1 | Refills: 0
Start: 2025-08-26 | End: 2025-09-24

## 2025-08-26 RX ORDER — ALBUTEROL SULFATE 2.5 MG/3ML
2 VIAL, NEBULIZER (ML) INHALATION
Qty: 2 | Refills: 0
Start: 2025-08-26

## 2025-08-26 RX ORDER — ALPRAZOLAM 0.5 MG
1 TABLET, EXTENDED RELEASE 24 HR ORAL
Refills: 0 | DISCHARGE

## 2025-08-26 RX ORDER — BUPROPION HYDROBROMIDE 522 MG/1
3 TABLET, EXTENDED RELEASE ORAL
Refills: 0 | DISCHARGE

## 2025-08-26 RX ORDER — ALBUTEROL SULFATE 2.5 MG/3ML
2 VIAL, NEBULIZER (ML) INHALATION
Refills: 0 | DISCHARGE

## 2025-08-26 RX ORDER — ALBUTEROL SULFATE 2.5 MG/3ML
2 VIAL, NEBULIZER (ML) INHALATION
Qty: 2 | Refills: 0
Start: 2025-08-26 | End: 2025-09-24

## 2025-08-26 RX ADMIN — DEXTROMETHORPHAN HBR, GUAIFENESIN 600 MILLIGRAM(S): 200 LIQUID ORAL at 06:49

## 2025-08-26 RX ADMIN — ESCITALOPRAM OXALATE 20 MILLIGRAM(S): 20 TABLET ORAL at 18:14

## 2025-08-26 RX ADMIN — Medication 100 MILLIEQUIVALENT(S): at 09:44

## 2025-08-26 RX ADMIN — NICOTINE POLACRILEX 1 PATCH: 4 GUM, CHEWING ORAL at 06:42

## 2025-08-26 RX ADMIN — NICOTINE POLACRILEX 1 PATCH: 4 GUM, CHEWING ORAL at 11:59

## 2025-08-26 RX ADMIN — Medication 0.5 MILLIGRAM(S): at 12:01

## 2025-08-26 RX ADMIN — BUPROPION HYDROBROMIDE 450 MILLIGRAM(S): 522 TABLET, EXTENDED RELEASE ORAL at 12:00

## 2025-08-26 RX ADMIN — Medication 1 DOSE(S): at 10:54

## 2025-08-26 RX ADMIN — Medication 40 MILLIEQUIVALENT(S): at 09:07

## 2025-08-26 RX ADMIN — IPRATROPIUM BROMIDE AND ALBUTEROL SULFATE 3 MILLILITER(S): .5; 2.5 SOLUTION RESPIRATORY (INHALATION) at 02:12

## 2025-08-26 RX ADMIN — TOPIRAMATE 50 MILLIGRAM(S): 25 TABLET, FILM COATED ORAL at 09:18

## 2025-08-26 RX ADMIN — PREDNISONE 40 MILLIGRAM(S): 20 TABLET ORAL at 06:49

## 2025-08-26 RX ADMIN — Medication 100 MILLIEQUIVALENT(S): at 10:52

## 2025-08-26 RX ADMIN — TOPIRAMATE 50 MILLIGRAM(S): 25 TABLET, FILM COATED ORAL at 18:14

## 2025-08-26 RX ADMIN — NICOTINE POLACRILEX 1 PATCH: 4 GUM, CHEWING ORAL at 11:56

## 2025-08-28 LAB
CULTURE RESULTS: SIGNIFICANT CHANGE UP
CULTURE RESULTS: SIGNIFICANT CHANGE UP
SPECIMEN SOURCE: SIGNIFICANT CHANGE UP
SPECIMEN SOURCE: SIGNIFICANT CHANGE UP